# Patient Record
Sex: MALE | Race: WHITE
[De-identification: names, ages, dates, MRNs, and addresses within clinical notes are randomized per-mention and may not be internally consistent; named-entity substitution may affect disease eponyms.]

---

## 2019-11-04 ENCOUNTER — HOSPITAL ENCOUNTER (EMERGENCY)
Dept: HOSPITAL 4 - SED | Age: 74
Discharge: ANOTHER HOSPITAL | End: 2019-11-04
Payer: MEDICARE

## 2019-11-04 VITALS — SYSTOLIC BLOOD PRESSURE: 142 MMHG

## 2019-11-04 VITALS — HEIGHT: 69 IN | SYSTOLIC BLOOD PRESSURE: 142 MMHG | BODY MASS INDEX: 27.4 KG/M2 | WEIGHT: 185 LBS

## 2019-11-04 DIAGNOSIS — R09.02: Primary | ICD-10-CM

## 2019-11-04 DIAGNOSIS — R06.02: ICD-10-CM

## 2019-11-04 DIAGNOSIS — E87.6: ICD-10-CM

## 2019-11-04 DIAGNOSIS — R07.9: ICD-10-CM

## 2019-11-04 LAB
ALBUMIN SERPL BCP-MCNC: 2.6 G/DL (ref 3.4–4.8)
ALT SERPL W P-5'-P-CCNC: 14 U/L (ref 12–78)
ANION GAP SERPL CALCULATED.3IONS-SCNC: 7 MMOL/L (ref 5–15)
APPEARANCE UR: CLEAR
AST SERPL W P-5'-P-CCNC: 25 U/L (ref 10–37)
BASOPHILS # BLD AUTO: 0.1 K/UL (ref 0–0.2)
BASOPHILS NFR BLD AUTO: 1.8 % (ref 0–2)
BILIRUB SERPL-MCNC: 1.1 MG/DL (ref 0–1)
BILIRUB UR QL STRIP: NEGATIVE
BUN SERPL-MCNC: 19 MG/DL (ref 8–21)
CALCIUM SERPL-MCNC: 10 MG/DL (ref 8.4–11)
CHLORIDE SERPL-SCNC: 98 MMOL/L (ref 98–107)
COLOR UR: YELLOW
CREAT SERPL-MCNC: 1.24 MG/DL (ref 0.55–1.3)
EOSINOPHIL # BLD AUTO: 0.3 K/UL (ref 0–0.4)
EOSINOPHIL NFR BLD AUTO: 4.6 % (ref 0–4)
ERYTHROCYTE [DISTWIDTH] IN BLOOD BY AUTOMATED COUNT: 15.2 % (ref 9–15)
GFR SERPL CREATININE-BSD FRML MDRD: (no result) ML/MIN (ref 90–?)
GLUCOSE SERPL-MCNC: 167 MG/DL (ref 70–99)
GLUCOSE UR STRIP-MCNC: NEGATIVE MG/DL
HCT VFR BLD AUTO: 37.7 % (ref 36–54)
HGB BLD-MCNC: 13 G/DL (ref 14–18)
HGB UR QL STRIP: NEGATIVE
INR PPP: 1.1 (ref 0.8–1.2)
KETONES UR STRIP-MCNC: NEGATIVE MG/DL
LEUKOCYTE ESTERASE UR QL STRIP: NEGATIVE
LYMPHOCYTES # BLD AUTO: 1.5 K/UL (ref 1–5.5)
LYMPHOCYTES NFR BLD AUTO: 21.2 % (ref 20.5–51.5)
MCH RBC QN AUTO: 29 PG (ref 27–31)
MCHC RBC AUTO-ENTMCNC: 34 % (ref 32–36)
MCV RBC AUTO: 85 FL (ref 79–98)
MONOCYTES # BLD MANUAL: 0.5 K/UL (ref 0–1)
MONOCYTES # BLD MANUAL: 7.4 % (ref 1.7–9.3)
NEUTROPHILS # BLD AUTO: 4.4 K/UL (ref 1.8–7.7)
NEUTROPHILS NFR BLD AUTO: 65 % (ref 40–70)
NITRITE UR QL STRIP: NEGATIVE
PH UR STRIP: 6 [PH] (ref 5–8)
PLATELET # BLD AUTO: 153 K/UL (ref 130–430)
POTASSIUM SERPL-SCNC: 3.2 MMOL/L (ref 3.5–5.1)
PROT UR QL STRIP: NEGATIVE
PROTHROMBIN TIME: 11 SECS (ref 9.5–12.5)
RBC # BLD AUTO: 4.45 MIL/UL (ref 4.2–6.2)
SODIUM SERPLBLD-SCNC: 131 MMOL/L (ref 136–145)
SP GR UR STRIP: 1.02 (ref 1–1.03)
UROBILINOGEN UR STRIP-MCNC: 0.2 MG/DL (ref 0.2–1)
WBC # BLD AUTO: 6.8 K/UL (ref 4.8–10.8)

## 2019-11-04 PROCEDURE — 36600 WITHDRAWAL OF ARTERIAL BLOOD: CPT

## 2019-11-04 PROCEDURE — 85379 FIBRIN DEGRADATION QUANT: CPT

## 2019-11-04 PROCEDURE — 83880 ASSAY OF NATRIURETIC PEPTIDE: CPT

## 2019-11-04 PROCEDURE — 82803 BLOOD GASES ANY COMBINATION: CPT

## 2019-11-04 PROCEDURE — 87040 BLOOD CULTURE FOR BACTERIA: CPT

## 2019-11-04 PROCEDURE — 99285 EMERGENCY DEPT VISIT HI MDM: CPT

## 2019-11-04 PROCEDURE — 81003 URINALYSIS AUTO W/O SCOPE: CPT

## 2019-11-04 PROCEDURE — 80053 COMPREHEN METABOLIC PANEL: CPT

## 2019-11-04 PROCEDURE — 71275 CT ANGIOGRAPHY CHEST: CPT

## 2019-11-04 PROCEDURE — 94640 AIRWAY INHALATION TREATMENT: CPT

## 2019-11-04 PROCEDURE — 85025 COMPLETE CBC W/AUTO DIFF WBC: CPT

## 2019-11-04 PROCEDURE — 85730 THROMBOPLASTIN TIME PARTIAL: CPT

## 2019-11-04 PROCEDURE — 93005 ELECTROCARDIOGRAM TRACING: CPT

## 2019-11-04 PROCEDURE — 36415 COLL VENOUS BLD VENIPUNCTURE: CPT

## 2019-11-04 PROCEDURE — 85610 PROTHROMBIN TIME: CPT

## 2019-11-04 PROCEDURE — 71045 X-RAY EXAM CHEST 1 VIEW: CPT

## 2019-11-04 PROCEDURE — 83605 ASSAY OF LACTIC ACID: CPT

## 2019-11-04 PROCEDURE — 87086 URINE CULTURE/COLONY COUNT: CPT

## 2019-11-04 PROCEDURE — 84484 ASSAY OF TROPONIN QUANT: CPT

## 2019-11-04 NOTE — NUR
Pt brought in by [facility]. Pt awak, alert, oriented x4. Pt states chief 
complaint of shortness of breath. Pt states that he had sudden onset of 
shortness of breath today and called 911. Pt denies chest pain, nausea, 
vomiting, diarrhea at this time. Pt denies any other medical complaint at this 
time. Pt displaying mild nasal flaring and expiratory wheezes upon presentation 
to ED. Pt in bed, resting, VSS.

## 2019-11-04 NOTE — NUR
-------------------------------------------------------------------------------

           *** Note undone in ED - 11/04/19 at 0707 by [email or web address] ***            

-------------------------------------------------------------------------------

Placed in room 3  . Placed on cardiac monitor, blood pressure machine and pulse 
oximeter. To gown for exam. Side rails up.

## 2019-11-04 NOTE — NUR
report received from [facility] [place]. Pt is currently waiting to be transferred to 
[facility].

## 2019-11-04 NOTE — NUR
Patient to be transferred to [facility], .  Is being transferred due to higher 
level of care.  Receiving facility has accepting physician and available space. 
ER physician has signed transfer form.  Patient or responsible party has agreed 
to transfer and signed form.  Patient belongings inventoried and will be sent 
with patient.  Copy of nursing notes, lab reports, EKG, Physicians Orders and 
X-rays to be sent with patient.  Report called to David CARNES at receiving 
facility. Receiving physician is FABRIZIO Acosta arranged ambulance 
service has been called for transfer.  ETA is 0900.

## 2019-11-04 NOTE — NUR
Patient transported to radiology via [facility] for CTA Chest with Contrast, 
accompanied by Iavna. Pt signed Consent.

## 2019-11-12 ENCOUNTER — HOSPITAL ENCOUNTER (EMERGENCY)
Dept: HOSPITAL 4 - SED | Age: 74
LOS: 1 days | Discharge: ANOTHER HOSPITAL | End: 2019-11-13
Payer: MEDICARE

## 2019-11-12 VITALS — WEIGHT: 198 LBS | HEIGHT: 69 IN | BODY MASS INDEX: 29.33 KG/M2

## 2019-11-12 VITALS — SYSTOLIC BLOOD PRESSURE: 148 MMHG

## 2019-11-12 DIAGNOSIS — M54.5: Primary | ICD-10-CM

## 2019-11-12 LAB
ALBUMIN SERPL BCP-MCNC: 2.3 G/DL (ref 3.4–4.8)
ALT SERPL W P-5'-P-CCNC: 11 U/L (ref 12–78)
AMORPH SED URNS QL MICRO: (no result) /HPF
ANION GAP SERPL CALCULATED.3IONS-SCNC: 9 MMOL/L (ref 5–15)
APPEARANCE UR: (no result)
AST SERPL W P-5'-P-CCNC: 22 U/L (ref 10–37)
BACTERIA URNS QL MICRO: (no result) /HPF
BASOPHILS # BLD AUTO: 0 K/UL (ref 0–0.2)
BASOPHILS NFR BLD AUTO: 0.5 % (ref 0–2)
BILIRUB SERPL-MCNC: 0.5 MG/DL (ref 0–1)
BILIRUB UR QL STRIP: NEGATIVE
BUN SERPL-MCNC: 22 MG/DL (ref 8–21)
CALCIUM SERPL-MCNC: 12 MG/DL (ref 8.4–11)
CHLORIDE SERPL-SCNC: 98 MMOL/L (ref 98–107)
COARSE GRAN CASTS URNS QL MICRO: (no result) /LPF
COLOR UR: YELLOW
CREAT SERPL-MCNC: 1.48 MG/DL (ref 0.55–1.3)
EOSINOPHIL # BLD AUTO: 0.1 K/UL (ref 0–0.4)
EOSINOPHIL NFR BLD AUTO: 2.4 % (ref 0–4)
ERYTHROCYTE [DISTWIDTH] IN BLOOD BY AUTOMATED COUNT: 15.1 % (ref 9–15)
GFR SERPL CREATININE-BSD FRML MDRD: (no result) ML/MIN (ref 90–?)
GLUCOSE SERPL-MCNC: 109 MG/DL (ref 70–99)
GLUCOSE UR STRIP-MCNC: NEGATIVE MG/DL
HCT VFR BLD AUTO: 34.2 % (ref 36–54)
HGB BLD-MCNC: 11.9 G/DL (ref 14–18)
HGB UR QL STRIP: NEGATIVE
KETONES UR STRIP-MCNC: NEGATIVE MG/DL
LEUKOCYTE ESTERASE UR QL STRIP: NEGATIVE
LYMPHOCYTES # BLD AUTO: 1 K/UL (ref 1–5.5)
LYMPHOCYTES NFR BLD AUTO: 18.8 % (ref 20.5–51.5)
MCH RBC QN AUTO: 30 PG (ref 27–31)
MCHC RBC AUTO-ENTMCNC: 35 % (ref 32–36)
MCV RBC AUTO: 86 FL (ref 79–98)
MONOCYTES # BLD MANUAL: 0.5 K/UL (ref 0–1)
MONOCYTES # BLD MANUAL: 10.3 % (ref 1.7–9.3)
MUCOUS THREADS URNS QL MICRO: (no result) /LPF
NEUTROPHILS # BLD AUTO: 3.6 K/UL (ref 1.8–7.7)
NEUTROPHILS NFR BLD AUTO: 68 % (ref 40–70)
NITRITE UR QL STRIP: NEGATIVE
PH UR STRIP: 6 [PH] (ref 5–8)
PLATELET # BLD AUTO: 172 K/UL (ref 130–430)
POTASSIUM SERPL-SCNC: 3.9 MMOL/L (ref 3.5–5.1)
PROT UR QL STRIP: NEGATIVE
RBC # BLD AUTO: 4 MIL/UL (ref 4.2–6.2)
RBC #/AREA URNS HPF: (no result) /HPF (ref 0–3)
SODIUM SERPLBLD-SCNC: 134 MMOL/L (ref 136–145)
SP GR UR STRIP: 1.01 (ref 1–1.03)
UROBILINOGEN UR STRIP-MCNC: 1 MG/DL (ref 0.2–1)
WBC # BLD AUTO: 5.3 K/UL (ref 4.8–10.8)
WBC #/AREA URNS HPF: (no result) /HPF (ref 0–3)

## 2019-11-12 PROCEDURE — 80053 COMPREHEN METABOLIC PANEL: CPT

## 2019-11-12 PROCEDURE — 85025 COMPLETE CBC W/AUTO DIFF WBC: CPT

## 2019-11-12 PROCEDURE — 99285 EMERGENCY DEPT VISIT HI MDM: CPT

## 2019-11-12 PROCEDURE — 36415 COLL VENOUS BLD VENIPUNCTURE: CPT

## 2019-11-12 PROCEDURE — 74176 CT ABD & PELVIS W/O CONTRAST: CPT

## 2019-11-12 PROCEDURE — 81000 URINALYSIS NONAUTO W/SCOPE: CPT

## 2019-11-12 PROCEDURE — 96372 THER/PROPH/DIAG INJ SC/IM: CPT

## 2019-11-12 NOTE — NUR
[facility]. Side rails up.

Report given to CRISTOFER Chavira

## 2019-11-12 NOTE — NUR
-------------------------------------------------------------------------------

           *** Note undone in EDM - 11/12/19 at 2001 by [email or web address] ***            

-------------------------------------------------------------------------------

Patient and law enforcement given written and verbal discharge instructions and 
verbalizes understanding.  ER MD discussed with patient the results and 
treatment provided. Patient in stable condition. ID arm band removed.

No Rx given. Patient educated on pain management and to follow up with PMD. 
Pain Scale 0/10

Opportunity for questions provided and answered.

## 2019-11-12 NOTE — NUR
-------------------------------------------------------------------------------

           *** Note rodrick in EDM - 11/12/19 at 2001 by [email or web address] ***            

-------------------------------------------------------------------------------

Patient brought in custody of [facility] Deputies for ok 
to book.   Patient appears to be in stable condition.  Patient does not 
verbalize any complaints.  Will continue to monitor.

## 2019-11-12 NOTE — NUR
Pt a&ox4. Pt presented in ER with c/o mid back pain that continues down lower 
back. Pt states pain is 10/10. Pt states it started Wednesday 11/6/2019. Pt 
denies fall. Pt denies heavy lifting. Will continue to monitor.

## 2019-11-13 VITALS — SYSTOLIC BLOOD PRESSURE: 149 MMHG

## 2019-11-13 NOTE — NUR
Patient to be transferred to [facility].  Is being transferred due to higher 
level of care.  Receiving facility has accepting physician and available space. 
ER physician has signed transfer form.  Patient or responsible party has agreed 
to transfer and signed form.  Patient belongings inventoried and will be sent 
with patient.  Copy of nursing notes, lab reports, EKG, Physicians Orders and 
X-rays to be sent with patient.  Report called to CRISTOFER Arteaga at receiving 
facility. Receiving physician is Dr. Faye. [facility] service has been 
called for transfer.  ETA is now.